# Patient Record
Sex: MALE | Race: WHITE | NOT HISPANIC OR LATINO | URBAN - METROPOLITAN AREA
[De-identification: names, ages, dates, MRNs, and addresses within clinical notes are randomized per-mention and may not be internally consistent; named-entity substitution may affect disease eponyms.]

---

## 2019-11-11 ENCOUNTER — OFFICE VISIT (OUTPATIENT)
Dept: OPHTHALMOLOGY | Facility: CLINIC | Age: 34
End: 2019-11-11
Payer: MEDICAID

## 2019-11-11 DIAGNOSIS — H47.20 OPTIC ATROPHY: Primary | ICD-10-CM

## 2019-11-11 DIAGNOSIS — H52.7 REFRACTION DISORDER: ICD-10-CM

## 2019-11-11 PROCEDURE — 99999 PR PBB SHADOW E&M-NEW PATIENT-LVL II: ICD-10-PCS | Mod: PBBFAC,,, | Performed by: OPHTHALMOLOGY

## 2019-11-11 PROCEDURE — 92133 POSTERIOR SEGMENT OCT OPTIC NERVE(OCULAR COHERENCE TOMOGRAPHY) - OU - BOTH EYES: ICD-10-PCS | Mod: 26,S$PBB,, | Performed by: OPHTHALMOLOGY

## 2019-11-11 PROCEDURE — 92133 CPTRZD OPH DX IMG PST SGM ON: CPT | Mod: PBBFAC | Performed by: OPHTHALMOLOGY

## 2019-11-11 PROCEDURE — 92004 COMPRE OPH EXAM NEW PT 1/>: CPT | Mod: S$PBB,,, | Performed by: OPHTHALMOLOGY

## 2019-11-11 PROCEDURE — 99999 PR PBB SHADOW E&M-NEW PATIENT-LVL II: CPT | Mod: PBBFAC,,, | Performed by: OPHTHALMOLOGY

## 2019-11-11 PROCEDURE — 92004 PR EYE EXAM, NEW PATIENT,COMPREHESV: ICD-10-PCS | Mod: S$PBB,,, | Performed by: OPHTHALMOLOGY

## 2019-11-11 PROCEDURE — 92015 PR REFRACTION: ICD-10-PCS | Mod: ,,, | Performed by: OPHTHALMOLOGY

## 2019-11-11 PROCEDURE — 99202 OFFICE O/P NEW SF 15 MIN: CPT | Mod: PBBFAC | Performed by: OPHTHALMOLOGY

## 2019-11-11 PROCEDURE — 92015 DETERMINE REFRACTIVE STATE: CPT | Mod: ,,, | Performed by: OPHTHALMOLOGY

## 2019-11-11 NOTE — PROGRESS NOTES
SUBJECTIVE:   Ciro Lanier is a 34 y.o. male   Corrected distance visual acuity was 20/80 in the right eye and 20/50 in the left eye.   Chief Complaint   Patient presents with    Blurred Vision     full exam        HPI:  HPI     Blurred Vision      Additional comments: full exam              Comments     Pt had trouble renewing his 's license recently. Was told he needed   to have a note from his doctor regarding his vision. He doesn't wear his   glasses often. Also questioning whether he has Usher's or not.     Dad-Kwesi Lanier [facility] patient  1. Optic Atrophy since ~1997  2. Fhx DM-dad  3. Fhx Coag -dad          Last edited by Kendrick Dale on 11/11/2019 10:25 AM. (History)        Assessment /Plan :  1. Optic atrophy -appears stable. Will continue to follow    2. 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

## 2024-04-10 ENCOUNTER — OFFICE VISIT (OUTPATIENT)
Dept: OPHTHALMOLOGY | Facility: CLINIC | Age: 39
End: 2024-04-10
Payer: MEDICARE

## 2024-04-10 DIAGNOSIS — H52.7 REFRACTION DISORDER: ICD-10-CM

## 2024-04-10 DIAGNOSIS — E11.9 DIABETES MELLITUS WITHOUT COMPLICATION: ICD-10-CM

## 2024-04-10 DIAGNOSIS — H47.20 OPTIC ATROPHY: Primary | ICD-10-CM

## 2024-04-10 PROCEDURE — 99999 PR PBB SHADOW E&M-NEW PATIENT-LVL II: CPT | Mod: PBBFAC,,, | Performed by: OPTOMETRIST

## 2024-04-10 PROCEDURE — 92014 COMPRE OPH EXAM EST PT 1/>: CPT | Mod: S$PBB,,, | Performed by: OPTOMETRIST

## 2024-04-10 PROCEDURE — 99202 OFFICE O/P NEW SF 15 MIN: CPT | Mod: PBBFAC,PO | Performed by: OPTOMETRIST

## 2024-04-10 NOTE — PROGRESS NOTES
HPI     Annual Exam            Comments: NP to [facility]  Patient here today for yearly eye exam          Comments    Vision changes since last eye exam?: Yes at distance  Has trouble looking at screens because of the lights  Color blind     Any eye pain today: No    Other ocular symptoms: No    Interested in contact lens fitting today? No    1. Optic Atrophy-1997   2. FHX Glaucoma- Dad          Last edited by Benita Calvillo, PCT on 4/10/2024  9:21 AM.            Assessment /Plan     For exam results, see Encounter Report.      1. Optic atrophy  Sees Dr PRIETO, gOCT appears stable at this time, continue per Dr [facility].     2. Diabetes mellitus without complication  There was no diabetic retinopathy present on either eye on examination today. Recommend good blood pressure control, strict blood glucose control, and good cholesterol control.  Continue close care with PCP regarding diabetes.      3. Refraction disorder  Eyeglass Final Rx       Eyeglass Final Rx         Sphere Cylinder Axis    Right -1.00 +0.75 074    Left -1.00 +1.00 105      Type: SVL    Expiration Date: 4/11/2025                   RTC 1 yr for dilated eye exam or sooner if any changes to vision.   Discussed above and answered questions.

## 2025-04-10 ENCOUNTER — OFFICE VISIT (OUTPATIENT)
Dept: OPHTHALMOLOGY | Facility: CLINIC | Age: 40
End: 2025-04-10
Payer: MEDICARE

## 2025-04-10 ENCOUNTER — TELEPHONE (OUTPATIENT)
Dept: OPHTHALMOLOGY | Facility: CLINIC | Age: 40
End: 2025-04-10
Payer: MEDICARE

## 2025-04-10 DIAGNOSIS — H52.13 MYOPIA WITH ASTIGMATISM, BILATERAL: ICD-10-CM

## 2025-04-10 DIAGNOSIS — H52.203 MYOPIA WITH ASTIGMATISM, BILATERAL: ICD-10-CM

## 2025-04-10 DIAGNOSIS — E11.9 DIABETES MELLITUS WITHOUT COMPLICATION: ICD-10-CM

## 2025-04-10 DIAGNOSIS — H47.20 OPTIC ATROPHY: Primary | ICD-10-CM

## 2025-04-10 PROCEDURE — 92014 COMPRE OPH EXAM EST PT 1/>: CPT | Mod: S$PBB,,, | Performed by: OPTOMETRIST

## 2025-04-10 PROCEDURE — 99999 PR PBB SHADOW E&M-EST. PATIENT-LVL I: CPT | Mod: PBBFAC,,, | Performed by: OPTOMETRIST

## 2025-04-10 PROCEDURE — 92015 DETERMINE REFRACTIVE STATE: CPT | Mod: ,,, | Performed by: OPTOMETRIST

## 2025-04-10 PROCEDURE — 99211 OFF/OP EST MAY X REQ PHY/QHP: CPT | Mod: PBBFAC,PO | Performed by: OPTOMETRIST

## 2025-04-10 NOTE — TELEPHONE ENCOUNTER
After Ciro was done with his visit with Dr. Duarte, we made sure to fill in his notes on DMV paperwork and made sure that pt and pt's mother went over paperwork before they had intially left the ophthalmology department. After 5-6 minutes pt's mother comes back and wants to speak with staff regarding the paperwork. We lead Hany Lanier back to the nurses desk to discuss what is wrong with paperwork or if she had any questions. Ms. Leach replied that the paperwork was incorrect and he needed to drive at night. Ms. Leach's states that Dr. Duarte needs to fix the paperwork and she doesn't understand why this year  would misael that he could not recommend to drive at night. We then informed Dr. Duarte that pt needed to speak with him regarding the results of his visit and also the DMV paperwork. We then lead Ms. Leach into a room with Dr. Duarte where they began to discuss the results of the OCT, the notes on paperwork, and also why he can't put on the paperwork that he would recommend Ciro to drive at night due to his optic nerve pallor, low vision, and reduced color vision. Ms. Leach states that he needs to fix it and needs Ciro to drive at night and that she also doesn't want him to get restrictions on his license. Ciro Lanier who was out in [facility] then also came into the room with his mother and were both trying to argue about the paperwork with Dr. Duarte and between one another. Dr. Duarte again explained that due to his VA and pallor that he cannot change the paperwork because his license dictates that needs to be fair and honest and cannot change his dx due to requests.

## 2025-04-10 NOTE — PROGRESS NOTES
HPI     Annual Exam            Comments: Mom states patient hasn't been complaining about VA  Here to get DMV papers signed  No pain            Comments      *Patient refused rupali*          Last edited by La Nena Arreguin on 4/10/2025 10:45 AM.            Assessment /Plan     For exam results, see Encounter Report.      1. Optic atrophy  Longstanding, previously diagnosed by Dr Chelsea Serrano.  gOCT stable.    2. Diabetes mellitus without complication  Last A1c 6.2 There was no diabetic retinopathy present on either eye on examination today. Recommend good blood pressure control, strict blood glucose control, and good cholesterol control.  Continue close care with Dr. Gonsalez regarding diabetes.     3. Myopia with astigmatism, bilateral  Eyeglass Final Rx       Eyeglass Final Rx         Sphere Cylinder Axis    Right -1.25 +0.75 096    Left -1.50 +1.25 105      Type: SVL    Expiration Date: 4/10/2026                  RTC 1 yr for dilated eye exam or sooner if any changes to vision.   Discussed above and answered questions.

## 2025-04-15 ENCOUNTER — OFFICE VISIT (OUTPATIENT)
Dept: OPHTHALMOLOGY | Facility: CLINIC | Age: 40
End: 2025-04-15
Payer: MEDICARE

## 2025-04-15 DIAGNOSIS — H47.20 OPTIC ATROPHY: Primary | ICD-10-CM

## 2025-04-15 PROCEDURE — 99211 OFF/OP EST MAY X REQ PHY/QHP: CPT | Mod: PBBFAC | Performed by: OPHTHALMOLOGY

## 2025-04-15 PROCEDURE — 99499 UNLISTED E&M SERVICE: CPT | Mod: S$PBB,,, | Performed by: OPHTHALMOLOGY

## 2025-04-15 PROCEDURE — 99999 PR PBB SHADOW E&M-EST. PATIENT-LVL I: CPT | Mod: PBBFAC,,, | Performed by: OPHTHALMOLOGY
